# Patient Record
Sex: MALE | Race: OTHER | HISPANIC OR LATINO | URBAN - METROPOLITAN AREA
[De-identification: names, ages, dates, MRNs, and addresses within clinical notes are randomized per-mention and may not be internally consistent; named-entity substitution may affect disease eponyms.]

---

## 2021-01-21 ENCOUNTER — INPATIENT (INPATIENT)
Facility: HOSPITAL | Age: 56
LOS: 0 days | Discharge: HOME | DRG: 287 | End: 2021-01-22
Attending: INTERNAL MEDICINE | Admitting: INTERNAL MEDICINE
Payer: COMMERCIAL

## 2021-01-21 VITALS
WEIGHT: 220.02 LBS | OXYGEN SATURATION: 99 % | DIASTOLIC BLOOD PRESSURE: 82 MMHG | TEMPERATURE: 99 F | RESPIRATION RATE: 18 BRPM | HEART RATE: 67 BPM | SYSTOLIC BLOOD PRESSURE: 145 MMHG

## 2021-01-21 DIAGNOSIS — R07.9 CHEST PAIN, UNSPECIFIED: ICD-10-CM

## 2021-01-21 DIAGNOSIS — I10 ESSENTIAL (PRIMARY) HYPERTENSION: ICD-10-CM

## 2021-01-21 DIAGNOSIS — E78.5 HYPERLIPIDEMIA, UNSPECIFIED: ICD-10-CM

## 2021-01-21 DIAGNOSIS — N40.0 BENIGN PROSTATIC HYPERPLASIA WITHOUT LOWER URINARY TRACT SYMPTOMS: ICD-10-CM

## 2021-01-21 DIAGNOSIS — E04.1 NONTOXIC SINGLE THYROID NODULE: Chronic | ICD-10-CM

## 2021-01-21 LAB
ALBUMIN SERPL ELPH-MCNC: 4.8 G/DL — SIGNIFICANT CHANGE UP (ref 3.3–5)
ALP SERPL-CCNC: 69 U/L — SIGNIFICANT CHANGE UP (ref 40–120)
ALT FLD-CCNC: 89 U/L — HIGH (ref 10–45)
ANION GAP SERPL CALC-SCNC: 12 MMOL/L — SIGNIFICANT CHANGE UP (ref 5–17)
APPEARANCE UR: CLEAR — SIGNIFICANT CHANGE UP
APTT BLD: 31.4 SEC — SIGNIFICANT CHANGE UP (ref 27.5–35.5)
AST SERPL-CCNC: 57 U/L — HIGH (ref 10–40)
BASOPHILS # BLD AUTO: 0.07 K/UL — SIGNIFICANT CHANGE UP (ref 0–0.2)
BASOPHILS NFR BLD AUTO: 0.9 % — SIGNIFICANT CHANGE UP (ref 0–2)
BILIRUB SERPL-MCNC: 0.4 MG/DL — SIGNIFICANT CHANGE UP (ref 0.2–1.2)
BILIRUB UR-MCNC: NEGATIVE — SIGNIFICANT CHANGE UP
BUN SERPL-MCNC: 16 MG/DL — SIGNIFICANT CHANGE UP (ref 7–23)
CALCIUM SERPL-MCNC: 10.2 MG/DL — SIGNIFICANT CHANGE UP (ref 8.4–10.5)
CHLORIDE SERPL-SCNC: 99 MMOL/L — SIGNIFICANT CHANGE UP (ref 96–108)
CO2 SERPL-SCNC: 29 MMOL/L — SIGNIFICANT CHANGE UP (ref 22–31)
COLOR SPEC: YELLOW — SIGNIFICANT CHANGE UP
CREAT SERPL-MCNC: 0.84 MG/DL — SIGNIFICANT CHANGE UP (ref 0.5–1.3)
DIFF PNL FLD: NEGATIVE — SIGNIFICANT CHANGE UP
EOSINOPHIL # BLD AUTO: 0.22 K/UL — SIGNIFICANT CHANGE UP (ref 0–0.5)
EOSINOPHIL NFR BLD AUTO: 2.8 % — SIGNIFICANT CHANGE UP (ref 0–6)
GLUCOSE SERPL-MCNC: 83 MG/DL — SIGNIFICANT CHANGE UP (ref 70–99)
GLUCOSE UR QL: NEGATIVE — SIGNIFICANT CHANGE UP
HCT VFR BLD CALC: 46.5 % — SIGNIFICANT CHANGE UP (ref 39–50)
HGB BLD-MCNC: 15.3 G/DL — SIGNIFICANT CHANGE UP (ref 13–17)
IMM GRANULOCYTES NFR BLD AUTO: 0.4 % — SIGNIFICANT CHANGE UP (ref 0–1.5)
INR BLD: 1.02 — SIGNIFICANT CHANGE UP (ref 0.88–1.16)
KETONES UR-MCNC: NEGATIVE — SIGNIFICANT CHANGE UP
LEUKOCYTE ESTERASE UR-ACNC: NEGATIVE — SIGNIFICANT CHANGE UP
LIDOCAIN IGE QN: 26 U/L — SIGNIFICANT CHANGE UP (ref 7–60)
LYMPHOCYTES # BLD AUTO: 2.01 K/UL — SIGNIFICANT CHANGE UP (ref 1–3.3)
LYMPHOCYTES # BLD AUTO: 25.2 % — SIGNIFICANT CHANGE UP (ref 13–44)
MAGNESIUM SERPL-MCNC: 2.5 MG/DL — SIGNIFICANT CHANGE UP (ref 1.6–2.6)
MCHC RBC-ENTMCNC: 31.7 PG — SIGNIFICANT CHANGE UP (ref 27–34)
MCHC RBC-ENTMCNC: 32.9 GM/DL — SIGNIFICANT CHANGE UP (ref 32–36)
MCV RBC AUTO: 96.5 FL — SIGNIFICANT CHANGE UP (ref 80–100)
MONOCYTES # BLD AUTO: 1.01 K/UL — HIGH (ref 0–0.9)
MONOCYTES NFR BLD AUTO: 12.7 % — SIGNIFICANT CHANGE UP (ref 2–14)
NEUTROPHILS # BLD AUTO: 4.64 K/UL — SIGNIFICANT CHANGE UP (ref 1.8–7.4)
NEUTROPHILS NFR BLD AUTO: 58 % — SIGNIFICANT CHANGE UP (ref 43–77)
NITRITE UR-MCNC: NEGATIVE — SIGNIFICANT CHANGE UP
NRBC # BLD: 0 /100 WBCS — SIGNIFICANT CHANGE UP (ref 0–0)
PH UR: 6 — SIGNIFICANT CHANGE UP (ref 5–8)
PLATELET # BLD AUTO: 244 K/UL — SIGNIFICANT CHANGE UP (ref 150–400)
POTASSIUM SERPL-MCNC: 4.5 MMOL/L — SIGNIFICANT CHANGE UP (ref 3.5–5.3)
POTASSIUM SERPL-SCNC: 4.5 MMOL/L — SIGNIFICANT CHANGE UP (ref 3.5–5.3)
PROT SERPL-MCNC: 8.2 G/DL — SIGNIFICANT CHANGE UP (ref 6–8.3)
PROT UR-MCNC: NEGATIVE MG/DL — SIGNIFICANT CHANGE UP
PROTHROM AB SERPL-ACNC: 12.2 SEC — SIGNIFICANT CHANGE UP (ref 10.6–13.6)
RBC # BLD: 4.82 M/UL — SIGNIFICANT CHANGE UP (ref 4.2–5.8)
RBC # FLD: 12.1 % — SIGNIFICANT CHANGE UP (ref 10.3–14.5)
SARS-COV-2 RNA SPEC QL NAA+PROBE: SIGNIFICANT CHANGE UP
SODIUM SERPL-SCNC: 140 MMOL/L — SIGNIFICANT CHANGE UP (ref 135–145)
SP GR SPEC: 1.01 — SIGNIFICANT CHANGE UP (ref 1–1.03)
TROPONIN T SERPL-MCNC: <0.01 NG/ML — SIGNIFICANT CHANGE UP (ref 0–0.01)
UROBILINOGEN FLD QL: 0.2 E.U./DL — SIGNIFICANT CHANGE UP
WBC # BLD: 7.98 K/UL — SIGNIFICANT CHANGE UP (ref 3.8–10.5)
WBC # FLD AUTO: 7.98 K/UL — SIGNIFICANT CHANGE UP (ref 3.8–10.5)

## 2021-01-21 PROCEDURE — 71045 X-RAY EXAM CHEST 1 VIEW: CPT | Mod: 26

## 2021-01-21 PROCEDURE — 93458 L HRT ARTERY/VENTRICLE ANGIO: CPT | Mod: 26

## 2021-01-21 PROCEDURE — 93010 ELECTROCARDIOGRAM REPORT: CPT

## 2021-01-21 PROCEDURE — 99285 EMERGENCY DEPT VISIT HI MDM: CPT

## 2021-01-21 PROCEDURE — 93010 ELECTROCARDIOGRAM REPORT: CPT | Mod: 77

## 2021-01-21 RX ORDER — NITROGLYCERIN 6.5 MG
0.4 CAPSULE, EXTENDED RELEASE ORAL ONCE
Refills: 0 | Status: COMPLETED | OUTPATIENT
Start: 2021-01-21 | End: 2021-01-21

## 2021-01-21 RX ORDER — ATORVASTATIN CALCIUM 80 MG/1
0 TABLET, FILM COATED ORAL
Qty: 0 | Refills: 0 | DISCHARGE

## 2021-01-21 RX ORDER — ASPIRIN/CALCIUM CARB/MAGNESIUM 324 MG
325 TABLET ORAL ONCE
Refills: 0 | Status: COMPLETED | OUTPATIENT
Start: 2021-01-21 | End: 2021-01-21

## 2021-01-21 RX ORDER — ACETAMINOPHEN 500 MG
650 TABLET ORAL ONCE
Refills: 0 | Status: COMPLETED | OUTPATIENT
Start: 2021-01-21 | End: 2021-01-21

## 2021-01-21 RX ORDER — FINASTERIDE 5 MG/1
5 TABLET, FILM COATED ORAL DAILY
Refills: 0 | Status: DISCONTINUED | OUTPATIENT
Start: 2021-01-21 | End: 2021-01-22

## 2021-01-21 RX ORDER — FINASTERIDE 5 MG/1
1 TABLET, FILM COATED ORAL
Qty: 0 | Refills: 0 | DISCHARGE

## 2021-01-21 RX ORDER — SODIUM CHLORIDE 9 MG/ML
500 INJECTION INTRAMUSCULAR; INTRAVENOUS; SUBCUTANEOUS
Refills: 0 | Status: DISCONTINUED | OUTPATIENT
Start: 2021-01-21 | End: 2021-01-21

## 2021-01-21 RX ORDER — ATORVASTATIN CALCIUM 80 MG/1
40 TABLET, FILM COATED ORAL AT BEDTIME
Refills: 0 | Status: DISCONTINUED | OUTPATIENT
Start: 2021-01-21 | End: 2021-01-22

## 2021-01-21 RX ORDER — CHLORHEXIDINE GLUCONATE 213 G/1000ML
1 SOLUTION TOPICAL ONCE
Refills: 0 | Status: DISCONTINUED | OUTPATIENT
Start: 2021-01-21 | End: 2021-01-21

## 2021-01-21 RX ORDER — AMLODIPINE BESYLATE 2.5 MG/1
5 TABLET ORAL DAILY
Refills: 0 | Status: DISCONTINUED | OUTPATIENT
Start: 2021-01-21 | End: 2021-01-22

## 2021-01-21 RX ORDER — METOPROLOL TARTRATE 50 MG
12.5 TABLET ORAL DAILY
Refills: 0 | Status: DISCONTINUED | OUTPATIENT
Start: 2021-01-21 | End: 2021-01-22

## 2021-01-21 RX ORDER — LOSARTAN POTASSIUM 100 MG/1
1 TABLET, FILM COATED ORAL
Qty: 0 | Refills: 0 | DISCHARGE

## 2021-01-21 RX ORDER — AMLODIPINE BESYLATE 2.5 MG/1
0 TABLET ORAL
Qty: 0 | Refills: 0 | DISCHARGE

## 2021-01-21 RX ORDER — SODIUM CHLORIDE 9 MG/ML
500 INJECTION INTRAMUSCULAR; INTRAVENOUS; SUBCUTANEOUS
Refills: 0 | Status: DISCONTINUED | OUTPATIENT
Start: 2021-01-21 | End: 2021-01-22

## 2021-01-21 RX ORDER — AMLODIPINE BESYLATE 2.5 MG/1
1 TABLET ORAL
Qty: 0 | Refills: 0 | DISCHARGE

## 2021-01-21 RX ORDER — ASPIRIN/CALCIUM CARB/MAGNESIUM 324 MG
81 TABLET ORAL DAILY
Refills: 0 | Status: DISCONTINUED | OUTPATIENT
Start: 2021-01-21 | End: 2021-01-22

## 2021-01-21 RX ORDER — CLOPIDOGREL BISULFATE 75 MG/1
600 TABLET, FILM COATED ORAL ONCE
Refills: 0 | Status: COMPLETED | OUTPATIENT
Start: 2021-01-21 | End: 2021-01-21

## 2021-01-21 RX ADMIN — Medication 12.5 MILLIGRAM(S): at 20:56

## 2021-01-21 RX ADMIN — CLOPIDOGREL BISULFATE 600 MILLIGRAM(S): 75 TABLET, FILM COATED ORAL at 17:31

## 2021-01-21 RX ADMIN — Medication 650 MILLIGRAM(S): at 21:51

## 2021-01-21 RX ADMIN — SODIUM CHLORIDE 75 MILLILITER(S): 9 INJECTION INTRAMUSCULAR; INTRAVENOUS; SUBCUTANEOUS at 17:31

## 2021-01-21 RX ADMIN — Medication 0.4 MILLIGRAM(S): at 15:18

## 2021-01-21 RX ADMIN — SODIUM CHLORIDE 75 MILLILITER(S): 9 INJECTION INTRAMUSCULAR; INTRAVENOUS; SUBCUTANEOUS at 19:03

## 2021-01-21 RX ADMIN — ATORVASTATIN CALCIUM 40 MILLIGRAM(S): 80 TABLET, FILM COATED ORAL at 20:56

## 2021-01-21 NOTE — ED ADULT NURSE NOTE - OBJECTIVE STATEMENT
Patient is a 55y male complaining of chest pain x 2 days. Pt states, "I was walking around the supermarket and I started to feel numbness on my left arm and a sharp pain in my left chest. I woke up today and  felt better then the pain started at around 9:30 today." Pt reports left sided neck pain. Pain feels the same in all positions. Pt describes chest pain as tightness. Pt endorses taking 4 aspirin pills at his doctor this morning. Hx of hypertension, hyperlipidemia. Pt denies SOB, N/V, dizziness, fever, chills, syncope.

## 2021-01-21 NOTE — ED PROVIDER NOTE - CLINICAL SUMMARY MEDICAL DECISION MAKING FREE TEXT BOX
Patient presents to ED with concern for CP that began yesterday.  Patient took ASA pta in ED today.  EKG non ischemic.  Initial trop negative.  Given nitro SL with improvement in discomfort.  I spoke with Dr. Mclean who is agreeable to admission given active discomfort and cardiac risk factors.  Patient to be admitted for cardiac cath as per Dr. Mclean.  Dr. Henley agreeable to plan for admission.  Will admit at this time.

## 2021-01-21 NOTE — H&P ADULT - PROBLEM SELECTOR PLAN 3
- takes lipitor at home (unknown dose); will need to confirm dose  - f/u lipid profie  - c/w lipitor 40 mg daily for now. - takes lipitor at home (unknown dose); will need to confirm dose  - f/u lipid profie  - c/w lipitor 40 mg daily for now.    takes propecia daily for hairloss interchanged to finasteride 5 mg daily while in hospital.     DVT PPX: holding as pt scheduled for coronary angiogram  Dispo: pending cath results

## 2021-01-21 NOTE — ED PROVIDER NOTE - HIV OFFER
Otc Regimen: Aveeno eczema therapy, Neutrogena Norwegian hand cream Detail Level: Zone Samples Given: Aveeno eczema therapy, Neutrogena Norwegian hand cream Continue Regimen: Triamcinolone PRN Opt out

## 2021-01-21 NOTE — H&P ADULT - ASSESSMENT
54 y/o M current smoker, FHx of MI (Dad at age 70 and mom  of MI at age 77) with PMH of HTN, HLD, benign thyroid nodule s/p resection Who is now admitted to cardiology service for further management of chest pain with plan for cardiac cath with possible intervention 2/2 pt risk factors, strong FHx of CAD and CCS class 3 sx.        H/H 15.3/46.5. Pt denies bleeding, GI bleeding, hematemesis, hematuria, BRBPR or melena . Pt loaded with ASA 81 mg PO X 4  and Plavix 600 mg PO X 1 pre-cath.  Cr. 0.84 IV NS@ 75  cc/hr pre-cath.  Of note pt reports taking Losartan, amlodipine, Lipitor at home (doesn;t recall dose); also reports he recently started taking propecia 1 mg daily for hair loss. Doses for losartan and amlodipine confirmed with pharmacy. However, they do not have prescription for lipitor.   Pt pre-cath consented for cardiac cath.   COVID results pending.     Risks & benefits of procedure and alternative therapy have been explained to the patient including but not limited to: allergic reaction, bleeding w/possible need for blood transfusion, infection, renal and vascular compromise, limb damage, arrhythmia, stroke, vessel dissection/perforation, Myocardial infarction, emergent CABG. Informed consent obtained and in chart

## 2021-01-21 NOTE — DISCHARGE NOTE PROVIDER - NSDCCPTREATMENT_GEN_ALL_CORE_FT
PRINCIPAL PROCEDURE  Procedure: 2D echocardiography  Findings and Treatment: CONCLUSIONS:   1. Normal left and right ventricular size and systolic function.   2. Mild symmetric left ventricular hypertrophy.   3. No significant valvular disease.   4. No evidence of pulmonary hypertension, pulmonary artery systolic pressure is 27 mmHg.   5. No pericardial effusion.

## 2021-01-21 NOTE — DISCHARGE NOTE PROVIDER - NSDCFUADDINST_GEN_ALL_CORE_FT
- Do NOT drive or operate hazardous machinery for 24 hours. Limit your physical activity for 24-48 hours. Do NOT engage in sports, heavy work or heavy lifting for 72 hours.   - You MAY shower BUT no TUB BATHS, HOT TUBS OR SWIMMING FOR 5 DAYS  - Your procedure was done through your right wrist. If you observe flank bleeding from the puncture site, it is an emergency. Please put direct pressure on the site and go directly to the ER. Bleeding under the skin may also occur and a small "black and blue" may be expected. If the area appears to be expanding or swelling around the puncture site, apply manual compression and go immediately to the nearest ER. If your arm/hand becomes cool or blue and/or you are unable to move it, this must be treated as an emergency, go directly to the nearest ER. Look for signs of infection in the wrist: fever, red streaking of the arm, obvious pus formation and pain.  -If you have any issues or concerns regarding your access site, you may call [facility] Cardiology at [phone].

## 2021-01-21 NOTE — ED ADULT NURSE REASSESSMENT NOTE - NS ED NURSE REASSESS COMMENT FT1
Pt's blood pressure is 139/68 after receiving nitroglycerin. Pt reports relief of pain. Patient resting comfortably. No acute distress noted at this time. Respirations even and unlabored.

## 2021-01-21 NOTE — DISCHARGE NOTE PROVIDER - HOSPITAL COURSE
56 y/o M current smoker, FHx of MI (Dad at age 70 and mom  of MI at age 77) with PMH of HTN, HLD, benign thyroid nodule s/p resection Who presented to [facility] ED 21 with CC of " new L sided CP radiating to  left neck/jaw and LUE numbness since yesterday." Pt describes the chest pain as pressure-like, rating it as 8/10 at its worst.  He reports that he was walking around the supermarket yesterday and started to experience left arm numbness and then had L sided chest pressure like pain radiating to L neck/jaw associated with SOB. He then took 2 baby ASA and sat down which resolved his sx. He then reports that today after he drank his coffee and was abt to take a shower, he experienced L arm numbness and then had L sided chest pain radiating to his L neck which prompted him to call his PCP Dr. Esparza who then instructed him to go to the ER. In ED, /82 HR 67 RR 18 T 98.8 02 99 RA Labs revealed Trop negative X 1, COVID pending.  EKG NSR with no ST-T changes noted.  CXR prelim read revealed no consildation/effusion with prominent interstitial markings.  In ED, Pt received ASA 81 mg PO X 4 and SLG NTG X 1 with improvement in chest pain. Pt was admitted to cardiology service for further management of chest pain with plan for cardiac cath with possible intervention 2/2 pt risk factors, strong FHx of CAD and CCS class 3 sx.      He is now s/p diagnostic cardiac cath (21) w/ mLAD myocardial bridge; RCA normal, LM normal, LCx normal. R radial access. Toprol 12.5 mg daily initiated for myocardial bridge. Pt was admitted overnight to [facility] for monitoring and has been seen and examined at bedside this morning. Pt is out of bed and ambulating with no complaints. R radial access stable with no hematoma, no bleed, 2+ radial pulse. Lab values, telemetry, and vital signs reviewed and remained stable. Echo 21: _____________________ . Discharge medication regimen reviewed with patient and Dr. Henley. Pt will continue with losartan 50mg daily, Norvasc 5mg daily, metoprolol succinate 12.5mg daily, atorvastatin ________________ daily. All discharge instructions reviewed with patient and medications e-prescribed to pharmacy. Pt is cleared for discharge per Dr. Henley, and will follow up with  _________ within 1-2 weeks of discharge.   56 y/o M current smoker, FHx of MI (Dad at age 70 and mom  of MI at age 77) with PMH of HTN, HLD, benign thyroid nodule s/p resection Who presented to [facility] ED 21 with CC of " new L sided CP radiating to  left neck/jaw and LUE numbness since yesterday." Pt describes the chest pain as pressure-like, rating it as 8/10 at its worst.  He reports that he was walking around the supermarket yesterday and started to experience left arm numbness and then had L sided chest pressure like pain radiating to L neck/jaw associated with SOB. He then took 2 baby ASA and sat down which resolved his sx. He then reports that today after he drank his coffee and was abt to take a shower, he experienced L arm numbness and then had L sided chest pain radiating to his L neck which prompted him to call his PCP Dr. Esparza who then instructed him to go to the ER. In ED, /82 HR 67 RR 18 T 98.8 02 99 RA Labs revealed Trop negative X 1, COVID pending.  EKG NSR with no ST-T changes noted.  CXR prelim read revealed no consildation/effusion with prominent interstitial markings.  In ED, Pt received ASA 81 mg PO X 4 and SLG NTG X 1 with improvement in chest pain. Pt was admitted to cardiology service for further management of chest pain with plan for cardiac cath with possible intervention 2/2 pt risk factors, strong FHx of CAD and CCS class 3 sx.      He is now s/p diagnostic cardiac cath (21) w/ mLAD myocardial bridge; RCA normal, LM normal, LCx normal. R radial access. Toprol 12.5 mg daily initiated for myocardial bridge. Pt was admitted overnight to [facility] for monitoring and has been seen and examined at bedside this morning. Pt is out of bed and ambulating with no complaints. R radial access stable with no hematoma, no bleed, 2+ radial pulse. Lab values, telemetry, and vital signs reviewed and remained stable. Echo 21: _____________________ . Discharge medication regimen reviewed with patient and Dr. Henley. Pt will continue with losartan 50mg daily, Norvasc 5mg daily, metoprolol succinate 12.5mg daily, atorvastatin ________________ daily. All discharge instructions reviewed with patient and medications e-prescribed to pharmacy. Pt is cleared for discharge per Dr. Henley, and will follow up with  _________ within 1-2 weeks of discharge. 56 y/o M current smoker, FHx of MI (Dad at age 70 and mom  of MI at age 77) with PMH of HTN, HLD, benign thyroid nodule s/p resection Who presented to [facility] ED 21 with CC of " new L sided CP radiating to  left neck/jaw and LUE numbness since yesterday." Pt describes the chest pain as pressure-like, rating it as 8/10 at its worst.  He reports that he was walking around the supermarket yesterday and started to experience left arm numbness and then had L sided chest pressure like pain radiating to L neck/jaw associated with SOB. He then took 2 baby ASA and sat down which resolved his sx. He then reports that today after he drank his coffee and was abt to take a shower, he experienced L arm numbness and then had L sided chest pain radiating to his L neck which prompted him to call his PCP Dr. Esparza who then instructed him to go to the ER. In ED, /82 HR 67 RR 18 T 98.8 02 99 RA Labs revealed Trop negative X 1, COVID pending.  EKG NSR with no ST-T changes noted.  CXR prelim read revealed no consildation/effusion with prominent interstitial markings.  In ED, Pt received ASA 81 mg PO X 4 and SLG NTG X 1 with improvement in chest pain. Pt was admitted to cardiology service for further management of chest pain with plan for cardiac cath with possible intervention 2/2 pt risk factors, strong FHx of CAD and CCS class 3 sx.      He is now s/p diagnostic cardiac cath (21) w/ mLAD myocardial bridge; RCA normal, LM normal, LCx normal. R radial access. Toprol 12.5 mg daily initiated for myocardial bridge. Pt was admitted overnight to [facility] for monitoring and has been seen and examined at bedside this morning. Pt is out of bed and ambulating with no complaints. R radial access stable with no hematoma, no bleed, 2+ radial pulse. Lab values, telemetry, and vital signs reviewed and remained stable. Echo 21 s/f EF 67%, mild conc LVH. Discharge medication regimen reviewed with patient and Dr. Henley. Pt will continue with losartan 50mg daily, Norvasc 5mg daily, metoprolol succinate 12.5mg daily, atorvastatin qd. All discharge instructions reviewed with patient and medications e-prescribed to pharmacy. Pt is cleared for discharge per Dr. Henley, and will follow up with Dr. Dale within 2 weeks of discharge.

## 2021-01-21 NOTE — ED ADULT NURSE NOTE - NSIMPLEMENTINTERV_GEN_ALL_ED
Implemented All Universal Safety Interventions:  [place] to call system. Call bell, personal items and telephone within reach. Instruct patient to call for assistance. Room bathroom lighting operational. Non-slip footwear when patient is off stretcher. Physically safe environment: no spills, clutter or unnecessary equipment. Stretcher in lowest position, wheels locked, appropriate side rails in place.

## 2021-01-21 NOTE — DISCHARGE NOTE PROVIDER - CARE PROVIDER_API CALL
[place]  CARDIOVASCULAR DISEASE  [address] [place] [address], [place] [address]  Phone: [phone]  Fax: [phone]  Follow Up Time: 2 weeks   Victor Hugo Esparza)  Internal Medicine  [address] [facility], [address]  [place] [address]  Phone: [phone]  Fax: [phone]  Established Patient  Follow Up Time: 2 weeks

## 2021-01-21 NOTE — DISCHARGE NOTE PROVIDER - PROVIDER TOKENS
PROVIDER:[TOKEN:[[id number]],FOLLOWUP:[2 weeks]] PROVIDER:[TOKEN:[[id number]],FOLLOWUP:[2 weeks],ESTABLISHEDPATIENT:[T]]

## 2021-01-21 NOTE — H&P ADULT - PROBLEM SELECTOR PLAN 4
- c/w home finasteride 5 mg daily.     DVT PPX: holding as pt scheduled for coronary angiogram  Dispo: pending cath results

## 2021-01-21 NOTE — H&P ADULT - PROBLEM SELECTOR PLAN 2
- takes norvasc 2.5 mg daily and losartan 50 mg daily as confirmed with pharmacy  - c/w home norvasc 2.5 mg daily  - resume home losartan 1/22 am pending am Cr. - takes norvasc 5 mg daily and losartan 50 mg daily as confirmed with pharmacy  - c/w home norvasc 5 mg daily  - resume home losartan 1/22 am pending am Cr.

## 2021-01-21 NOTE — DISCHARGE NOTE PROVIDER - NSDCCPCAREPLAN_GEN_ALL_CORE_FT
PRINCIPAL DISCHARGE DIAGNOSIS  Diagnosis: Chest pain, unspecified type  Assessment and Plan of Treatment: You underwent a cardiac angiogram which did not reveal any blockaged in your coronary arteries. You were found to have a myocardial bridge, which can cause chest pain. Avoid strenuous activity or heavy lifting for the next five days. Do not take a bath or swim for the next five days; you may shower. For any bleeding or hematoma formation (hardened blood collection under the skin) at the access site of your right wrist please hold pressure and go to the emergency room. Please follow up with  ___ in 1-2 weeks.   Please START metoprolol succinate (Toprol) 12.5mg daily, as this should help with chest pain caused by the myocardial bridge.         SECONDARY DISCHARGE DIAGNOSES  Diagnosis: Hypertension  Assessment and Plan of Treatment: Please continue losartan 50mg daily, amlodipine 5mg daily, and metoprolol succinate 12.5mg daily to keep your blood pressure controlled. For blood pressure that is too high or too low please see your doctor or go to the emergency room as necessary.      Diagnosis: Hyperlipemia  Assessment and Plan of Treatment: Please continue atorvastatin ______mg daily to keep your cholesterol low. High cholesterol contributes to heart disease.       PRINCIPAL DISCHARGE DIAGNOSIS  Diagnosis: Chest pain, unspecified type  Assessment and Plan of Treatment: You underwent a cardiac angiogram which did not reveal any blockaged in your coronary arteries. 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 Avoid strenuous activity or heavy lifting for the next five days. Do not take a bath or swim for the next five days; you may shower. For any bleeding or hematoma formation (hardened blood collection under the skin) at the access site of your right wrist please hold pressure and go to the emergency room. Please follow up with  ___ in 1-2 weeks.   Please START metoprolol succinate (Toprol) 12.5mg daily, as this should help with chest pain caused by the myocardial bridge.      SECONDARY DISCHARGE DIAGNOSES  Diagnosis: Hypertension  Assessment and Plan of Treatment: Please continue losartan 50mg daily, amlodipine 5mg daily, and metoprolol succinate 12.5mg daily to keep your blood pressure controlled. For blood pressure that is too high or too low please see your doctor or go to the emergency room as necessary.      Diagnosis: Hyperlipemia  Assessment and Plan of Treatment: Please continue atorvastatin ______mg daily to keep your cholesterol low. High cholesterol contributes to heart disease.       PRINCIPAL DISCHARGE DIAGNOSIS  Diagnosis: Chest pain, unspecified type  Assessment and Plan of Treatment: You underwent a cardiac angiogram which did not reveal any blockaged in your coronary arteries. 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 Avoid strenuous activity or heavy lifting more than 5 lbs for the next five days. Do not take a bath or swim for the next five days; you may shower. For any bleeding or hematoma formation (hardened blood collection under the skin) at the access site of your right wrist please hold pressure and go to the emergency room.    Please START metoprolol succinate (Toprol) 12.5mg once daily, as this should help with chest pain caused by the myocardial bridge.      SECONDARY DISCHARGE DIAGNOSES  Diagnosis: Hypertension  Assessment and Plan of Treatment: Please continue losartan 50mg daily, amlodipine 5mg daily, and metoprolol succinate 12.5mg daily to keep your blood pressure controlled. For blood pressure that is too high or too low please see your doctor or go to the emergency room as necessary.      Diagnosis: Hyperlipemia  Assessment and Plan of Treatment: Please continue atorvastatin daily to keep your cholesterol low. High cholesterol contributes to heart disease.

## 2021-01-21 NOTE — H&P ADULT - NSHPSOCIALHISTORY_GEN_ALL_CORE
current smoker; smokes 8 cig/day on and off X 20 years  denies any illicit drug use  drinks ETOH socially

## 2021-01-21 NOTE — DISCHARGE NOTE PROVIDER - NSDCMRMEDTOKEN_GEN_ALL_CORE_FT
Lipitor: orally once a day (at bedtime)  losartan 50 mg oral tablet: 1 tab(s) orally once a day  Norvasc 5 mg oral tablet: 1 tab(s) orally once a day  Propecia 1 mg oral tablet: 1 tab(s) orally once a day   Lipitor: orally once a day (at bedtime)  losartan 50 mg oral tablet: 1 tab(s) orally once a day  metoprolol succinate 25 mg oral capsule, extended release: 0.5 cap(s) orally once a day   Norvasc 5 mg oral tablet: 1 tab(s) orally once a day  Propecia 1 mg oral tablet: 1 tab(s) orally once a day

## 2021-01-21 NOTE — H&P ADULT - HISTORY OF PRESENT ILLNESS
54 y/o M current smoker, FHx of MI (Dad at age 70 and mom  of MI at age 77) with PMH of HTN, HLD, benign thyroid nodule s/p resection Who presented to [facility] ED 21 with CC of " new L sided CP radiating to  left neck/jaw and LUE numbness since yesterday." Pt describes the chest pain as pressure-like, rating it as 8/10 at its worst.  He reports that he was walking around the supermarket yesterday and started to experience left arm numbness and then had L sided chest pressure like pain radiating to L neck/jaw associated with SOB. He then took 2 baby ASA and sat down which resolved his sx. He then reports that today after he drank his coffee and was abt to take a shower, he experienced L arm numbness and then had L sided chest pain radiating to his L neck which prompted him to call his PCP Dr. Esparza who then instructed him to go to the ER.  Pt denies any N/V, dizziness, fever, chills, cough,  syncope, loss of taste, recent travel/sick contacts. In ED, /82 HR 67 RR 18 T 98.8 02 99 RA Labs revealed Trop negative X 1, COVID pending.  EKG NSR with no ST-T changes noted.  CXR prelim read revealed no consildation/effusion with prominent interstitial markings.  In ED, Pt received ASA 81 mg PO X 4 and SLG NTG X 1 with improvement in chest pain. Pt is now admitted to cardiology service for further management of chest pain with plan for cardiac cath with possible intervention 2/2 pt risk factors, strong FHx of CAD and CCS class 3 sx.

## 2021-01-21 NOTE — ED PROVIDER NOTE - OBJECTIVE STATEMENT
55 year old male with history of HTN, HLD, known to Dr. Braulio Mclean presents to ED with concern for chest pain x 2 days.  Patient notes pain is left sided and described as pressure like discomfort radiating into his left neck/jaw and left upper extremity.  He notes he was out running errands at symptom onset yesterday.  He took 2 baby ASA and sat down - feeling some improvement in symptoms.  Patient states pain has returned intermittently since onset yesterday, prompting his ED visit today.  He denies associated headache, visual changes, fever, chills, shortness of breath, abdominal pain, nausea, emesis, changes to bowel movements, peripheral edema, calf pain/tenderness or any additional acute complaints or concerns at this time.

## 2021-01-21 NOTE — H&P ADULT - NSHPLABSRESULTS_GEN_ALL_CORE
15.3   7.98  )-----------( 244      ( 21 Jan 2021 15:31 )             46.5   01-21    140  |  99  |  16  ----------------------------<  83  4.5   |  29  |  0.84    Ca    10.2      21 Jan 2021 15:31  Mg     2.5     01-21    TPro  8.2  /  Alb  4.8  /  TBili  0.4  /  DBili  x   /  AST  57<H>  /  ALT  89<H>  /  AlkPhos  69  01-21  PT/INR - ( 21 Jan 2021 15:31 )   PT: 12.2 sec;   INR: 1.02          PTT - ( 21 Jan 2021 15:31 )  PTT:31.4 sec

## 2021-01-21 NOTE — H&P ADULT - PROBLEM SELECTOR PLAN 1
- currently chest pain free; Chest improved with SLG NTG in ED  - Trops neg X 1; EKG non-ischemic  - consented for cardiac cath 2/2 pt risk factors, CCS class 3 sx and strong FHx of CAD  - loaded with  ASA 81 mg PO X 4 and Plavix 600 mg PO X 1  - will continue with ASA 81 mg daily; will order Plavix 75 mg daily pending cath results.  - ECHO ordered. f/u

## 2021-01-22 VITALS
HEART RATE: 72 BPM | SYSTOLIC BLOOD PRESSURE: 130 MMHG | OXYGEN SATURATION: 96 % | DIASTOLIC BLOOD PRESSURE: 68 MMHG | RESPIRATION RATE: 18 BRPM

## 2021-01-22 LAB
ANION GAP SERPL CALC-SCNC: 14 MMOL/L — SIGNIFICANT CHANGE UP (ref 5–17)
BUN SERPL-MCNC: 16 MG/DL — SIGNIFICANT CHANGE UP (ref 7–23)
CALCIUM SERPL-MCNC: 9.1 MG/DL — SIGNIFICANT CHANGE UP (ref 8.4–10.5)
CHLORIDE SERPL-SCNC: 102 MMOL/L — SIGNIFICANT CHANGE UP (ref 96–108)
CO2 SERPL-SCNC: 24 MMOL/L — SIGNIFICANT CHANGE UP (ref 22–31)
CREAT SERPL-MCNC: 0.92 MG/DL — SIGNIFICANT CHANGE UP (ref 0.5–1.3)
GLUCOSE SERPL-MCNC: 95 MG/DL — SIGNIFICANT CHANGE UP (ref 70–99)
HCT VFR BLD CALC: 42 % — SIGNIFICANT CHANGE UP (ref 39–50)
HGB BLD-MCNC: 13.9 G/DL — SIGNIFICANT CHANGE UP (ref 13–17)
MAGNESIUM SERPL-MCNC: 2.3 MG/DL — SIGNIFICANT CHANGE UP (ref 1.6–2.6)
MCHC RBC-ENTMCNC: 31.8 PG — SIGNIFICANT CHANGE UP (ref 27–34)
MCHC RBC-ENTMCNC: 33.1 GM/DL — SIGNIFICANT CHANGE UP (ref 32–36)
MCV RBC AUTO: 96.1 FL — SIGNIFICANT CHANGE UP (ref 80–100)
NRBC # BLD: 0 /100 WBCS — SIGNIFICANT CHANGE UP (ref 0–0)
PLATELET # BLD AUTO: 226 K/UL — SIGNIFICANT CHANGE UP (ref 150–400)
POTASSIUM SERPL-MCNC: 4.8 MMOL/L — SIGNIFICANT CHANGE UP (ref 3.5–5.3)
POTASSIUM SERPL-SCNC: 4.8 MMOL/L — SIGNIFICANT CHANGE UP (ref 3.5–5.3)
RBC # BLD: 4.37 M/UL — SIGNIFICANT CHANGE UP (ref 4.2–5.8)
RBC # FLD: 12.2 % — SIGNIFICANT CHANGE UP (ref 10.3–14.5)
SODIUM SERPL-SCNC: 140 MMOL/L — SIGNIFICANT CHANGE UP (ref 135–145)
WBC # BLD: 8.11 K/UL — SIGNIFICANT CHANGE UP (ref 3.8–10.5)
WBC # FLD AUTO: 8.11 K/UL — SIGNIFICANT CHANGE UP (ref 3.8–10.5)

## 2021-01-22 PROCEDURE — 99239 HOSP IP/OBS DSCHRG MGMT >30: CPT

## 2021-01-22 PROCEDURE — 93306 TTE W/DOPPLER COMPLETE: CPT | Mod: 26

## 2021-01-22 RX ORDER — METOPROLOL TARTRATE 50 MG
0.5 TABLET ORAL
Qty: 15 | Refills: 0
Start: 2021-01-22 | End: 2021-02-20

## 2021-01-22 RX ADMIN — Medication 81 MILLIGRAM(S): at 11:41

## 2021-01-22 RX ADMIN — FINASTERIDE 5 MILLIGRAM(S): 5 TABLET, FILM COATED ORAL at 11:41

## 2021-01-22 RX ADMIN — Medication 12.5 MILLIGRAM(S): at 06:06

## 2021-01-22 RX ADMIN — AMLODIPINE BESYLATE 5 MILLIGRAM(S): 2.5 TABLET ORAL at 06:06

## 2021-01-22 NOTE — DISCHARGE NOTE NURSING/CASE MANAGEMENT/SOCIAL WORK - PATIENT PORTAL LINK FT
You can access the FollowMyHealth Patient Portal offered by [facility] by registering at the following website: [email or web address]. By joining [email or web address] FollowMyHealth portal, you will also be able to view your health information using other applications (apps) compatible with our system.

## 2021-01-28 DIAGNOSIS — E78.5 HYPERLIPIDEMIA, UNSPECIFIED: ICD-10-CM

## 2021-01-28 DIAGNOSIS — Q24.5 MALFORMATION OF CORONARY VESSELS: ICD-10-CM

## 2021-01-28 DIAGNOSIS — F17.210 NICOTINE DEPENDENCE, CIGARETTES, UNCOMPLICATED: ICD-10-CM

## 2021-01-28 DIAGNOSIS — I10 ESSENTIAL (PRIMARY) HYPERTENSION: ICD-10-CM

## 2021-01-28 DIAGNOSIS — Z82.41 FAMILY HISTORY OF SUDDEN CARDIAC DEATH: ICD-10-CM

## 2021-01-28 DIAGNOSIS — R07.9 CHEST PAIN, UNSPECIFIED: ICD-10-CM

## 2021-02-02 PROCEDURE — 85610 PROTHROMBIN TIME: CPT

## 2021-02-02 PROCEDURE — 36415 COLL VENOUS BLD VENIPUNCTURE: CPT

## 2021-02-02 PROCEDURE — 85730 THROMBOPLASTIN TIME PARTIAL: CPT

## 2021-02-02 PROCEDURE — 85027 COMPLETE CBC AUTOMATED: CPT

## 2021-02-02 PROCEDURE — 71045 X-RAY EXAM CHEST 1 VIEW: CPT

## 2021-02-02 PROCEDURE — 99285 EMERGENCY DEPT VISIT HI MDM: CPT | Mod: 25

## 2021-02-02 PROCEDURE — 85025 COMPLETE CBC W/AUTO DIFF WBC: CPT

## 2021-02-02 PROCEDURE — 84443 ASSAY THYROID STIM HORMONE: CPT

## 2021-02-02 PROCEDURE — U0005: CPT

## 2021-02-02 PROCEDURE — C1894: CPT

## 2021-02-02 PROCEDURE — 80048 BASIC METABOLIC PNL TOTAL CA: CPT

## 2021-02-02 PROCEDURE — C1769: CPT

## 2021-02-02 PROCEDURE — C1887: CPT

## 2021-02-02 PROCEDURE — 80053 COMPREHEN METABOLIC PANEL: CPT

## 2021-02-02 PROCEDURE — 84484 ASSAY OF TROPONIN QUANT: CPT

## 2021-02-02 PROCEDURE — 93005 ELECTROCARDIOGRAM TRACING: CPT | Mod: 77

## 2021-02-02 PROCEDURE — 81003 URINALYSIS AUTO W/O SCOPE: CPT

## 2021-02-02 PROCEDURE — 93306 TTE W/DOPPLER COMPLETE: CPT

## 2021-02-02 PROCEDURE — 87635 SARS-COV-2 COVID-19 AMP PRB: CPT

## 2021-02-02 PROCEDURE — 80061 LIPID PANEL: CPT

## 2021-02-02 PROCEDURE — 83735 ASSAY OF MAGNESIUM: CPT

## 2021-02-02 PROCEDURE — 83036 HEMOGLOBIN GLYCOSYLATED A1C: CPT

## 2021-02-02 PROCEDURE — 83690 ASSAY OF LIPASE: CPT

## 2021-03-04 NOTE — H&P ADULT - RS GEN PE MLT RESP DETAILS PC
It was discussed that after refractive surgery, a certain number of patients experience glare, a ?star bursting? or halo effect around lights, or other low-light vision problems that may interfere with the ability to drive at night or see well in dim light. Although there are several possible causes for these difficulties to include pupils of any size, the risk may be increased in patients with large pupils or high degrees of correction. For most patients this is a temporary condition that diminishes with time or is correctable by wearing glasses at night or taking eye drops. For some patients, however, these visual problems are permanent. normal/airway patent/breath sounds equal/good air movement/respirations non-labored/clear to auscultation bilaterally/no chest wall tenderness/no intercostal retractions/no rales/no rhonchi/no subcutaneous emphysema/no wheezes